# Patient Record
Sex: MALE | Race: WHITE | NOT HISPANIC OR LATINO | Employment: FULL TIME | URBAN - METROPOLITAN AREA
[De-identification: names, ages, dates, MRNs, and addresses within clinical notes are randomized per-mention and may not be internally consistent; named-entity substitution may affect disease eponyms.]

---

## 2018-12-19 ENCOUNTER — TELEPHONE (OUTPATIENT)
Dept: TRANSPLANT | Facility: CLINIC | Age: 38
End: 2018-12-19

## 2018-12-19 NOTE — TELEPHONE ENCOUNTER
----- Message from Kevin Enriquez sent at 12/19/2018  3:20 PM CST -----  Regarding: FW: Ext Referral for Liver Disease     [place] [facility] [place] Will call referring MD office if we need any additional information on the pt.    By: eKvin Enriquez  ----- Message -----  From: Eleanor Loera  Sent: 12/19/2018   2:50 PM  To: [facility] Liver Referral Pool  Subject: Ext Referral for Liver Disease                   Jaziel Louis from [facility] is referring pt to Hep for Liver Disease.  Pt has [employer].  Records/ referral are in . Please review and call pt to schedule. Thank you

## 2019-01-02 ENCOUNTER — DOCUMENTATION ONLY (OUTPATIENT)
Dept: TRANSPLANT | Facility: CLINIC | Age: 39
End: 2019-01-02

## 2019-01-02 NOTE — NURSING
Pt records reviewed.  Pt will be referred to Hepatology due to fatty liver  Initial referral received  from Cinthya Louis NP  Referral letter sent to provider and patient.

## 2019-01-02 NOTE — LETTER
January 2, 2019    Cinthya Louis, COLLEEN  [address] [place] [place] [address]      Dear Dr. Louis    Patient: Henrik Santana   MR Number: [id number]   YOB: 1980     Thank you for the referral of Henrik Santana to the [facility] program. An initial appointment will be scheduled for your patient with one of our Hepatologists.      Thank you again for your trust in our program.  If there is anything we can do for you or your staff, please feel free to contact us.        Sincerely,        [facility]  [address] [place] [address] [phone]

## 2019-01-02 NOTE — LETTER
January 2, 2019    Henrik Santana  [address]  [place] [address]      Dear Henrik Santana:    Your doctor has referred you to the [facility]. You will be contacted by our office and an initial appointment will then be scheduled for you.    We look forward to seeing you soon. If you have any further questions, please contact us at [phone].       Sincerely,        [facility]   [facility] [place] [address]  [phone]

## 2019-01-11 ENCOUNTER — OFFICE VISIT (OUTPATIENT)
Dept: HEPATOLOGY | Facility: CLINIC | Age: 39
End: 2019-01-11
Payer: COMMERCIAL

## 2019-01-11 ENCOUNTER — LAB VISIT (OUTPATIENT)
Dept: LAB | Facility: HOSPITAL | Age: 39
End: 2019-01-11
Payer: COMMERCIAL

## 2019-01-11 VITALS
RESPIRATION RATE: 18 BRPM | SYSTOLIC BLOOD PRESSURE: 131 MMHG | WEIGHT: 196.44 LBS | BODY MASS INDEX: 30.83 KG/M2 | OXYGEN SATURATION: 96 % | HEART RATE: 97 BPM | DIASTOLIC BLOOD PRESSURE: 71 MMHG | HEIGHT: 67 IN

## 2019-01-11 DIAGNOSIS — R74.8 ELEVATED LIVER ENZYMES: ICD-10-CM

## 2019-01-11 DIAGNOSIS — K76.0 FATTY LIVER: ICD-10-CM

## 2019-01-11 DIAGNOSIS — E66.09 CLASS 1 OBESITY DUE TO EXCESS CALORIES WITH BODY MASS INDEX (BMI) OF 30.0 TO 30.9 IN ADULT, UNSPECIFIED WHETHER SERIOUS COMORBIDITY PRESENT: ICD-10-CM

## 2019-01-11 DIAGNOSIS — R74.8 ELEVATED LIVER ENZYMES: Primary | ICD-10-CM

## 2019-01-11 PROBLEM — E66.811 CLASS 1 OBESITY DUE TO EXCESS CALORIES WITH BODY MASS INDEX (BMI) OF 30.0 TO 30.9 IN ADULT: Status: ACTIVE | Noted: 2019-01-11

## 2019-01-11 LAB
ALBUMIN SERPL BCP-MCNC: 4.5 G/DL
ALP SERPL-CCNC: 45 U/L
ALT SERPL W/O P-5'-P-CCNC: 174 U/L
ANION GAP SERPL CALC-SCNC: 11 MMOL/L
AST SERPL-CCNC: 45 U/L
BASOPHILS # BLD AUTO: 0.05 K/UL
BASOPHILS NFR BLD: 0.7 %
BILIRUB SERPL-MCNC: 0.8 MG/DL
BUN SERPL-MCNC: 11 MG/DL
CALCIUM SERPL-MCNC: 10 MG/DL
CERULOPLASMIN SERPL-MCNC: 25 MG/DL
CHLORIDE SERPL-SCNC: 103 MMOL/L
CO2 SERPL-SCNC: 24 MMOL/L
CREAT SERPL-MCNC: 0.8 MG/DL
DIFFERENTIAL METHOD: ABNORMAL
EOSINOPHIL # BLD AUTO: 0.3 K/UL
EOSINOPHIL NFR BLD: 3.9 %
ERYTHROCYTE [DISTWIDTH] IN BLOOD BY AUTOMATED COUNT: 12 %
EST. GFR  (AFRICAN AMERICAN): >60 ML/MIN/1.73 M^2
EST. GFR  (NON AFRICAN AMERICAN): >60 ML/MIN/1.73 M^2
FERRITIN SERPL-MCNC: 422 NG/ML
GLUCOSE SERPL-MCNC: 90 MG/DL
HCT VFR BLD AUTO: 45.2 %
HGB BLD-MCNC: 15.4 G/DL
IGG SERPL-MCNC: 897 MG/DL
IGM SERPL-MCNC: 41 MG/DL
IMM GRANULOCYTES # BLD AUTO: 0.07 K/UL
IMM GRANULOCYTES NFR BLD AUTO: 0.9 %
INR PPP: 1
IRON SERPL-MCNC: 81 UG/DL
LYMPHOCYTES # BLD AUTO: 1.8 K/UL
LYMPHOCYTES NFR BLD: 24.2 %
MCH RBC QN AUTO: 29.4 PG
MCHC RBC AUTO-ENTMCNC: 34.1 G/DL
MCV RBC AUTO: 86 FL
MONOCYTES # BLD AUTO: 0.8 K/UL
MONOCYTES NFR BLD: 10.4 %
NEUTROPHILS # BLD AUTO: 4.4 K/UL
NEUTROPHILS NFR BLD: 59.9 %
NRBC BLD-RTO: 0 /100 WBC
PLATELET # BLD AUTO: 242 K/UL
PMV BLD AUTO: 10.1 FL
POTASSIUM SERPL-SCNC: 3.7 MMOL/L
PROT SERPL-MCNC: 7.7 G/DL
PROTHROMBIN TIME: 10.5 SEC
RBC # BLD AUTO: 5.24 M/UL
SATURATED IRON: 22 %
SODIUM SERPL-SCNC: 138 MMOL/L
TOTAL IRON BINDING CAPACITY: 370 UG/DL
TRANSFERRIN SERPL-MCNC: 250 MG/DL
WBC # BLD AUTO: 7.41 K/UL

## 2019-01-11 PROCEDURE — 82103 ALPHA-1-ANTITRYPSIN TOTAL: CPT

## 2019-01-11 PROCEDURE — 99999 PR PBB SHADOW E&M-EST. PATIENT-LVL IV: CPT | Mod: PBBFAC,,, | Performed by: NURSE PRACTITIONER

## 2019-01-11 PROCEDURE — 82784 ASSAY IGA/IGD/IGG/IGM EACH: CPT | Mod: 59

## 2019-01-11 PROCEDURE — 82728 ASSAY OF FERRITIN: CPT

## 2019-01-11 PROCEDURE — 85025 COMPLETE CBC W/AUTO DIFF WBC: CPT

## 2019-01-11 PROCEDURE — 99999 PR PBB SHADOW E&M-EST. PATIENT-LVL IV: ICD-10-PCS | Mod: PBBFAC,,, | Performed by: NURSE PRACTITIONER

## 2019-01-11 PROCEDURE — 82390 ASSAY OF CERULOPLASMIN: CPT

## 2019-01-11 PROCEDURE — 99204 PR OFFICE/OUTPT VISIT, NEW, LEVL IV, 45-59 MIN: ICD-10-PCS | Mod: S$GLB,,, | Performed by: NURSE PRACTITIONER

## 2019-01-11 PROCEDURE — 83540 ASSAY OF IRON: CPT

## 2019-01-11 PROCEDURE — 86038 ANTINUCLEAR ANTIBODIES: CPT

## 2019-01-11 PROCEDURE — 80053 COMPREHEN METABOLIC PANEL: CPT

## 2019-01-11 PROCEDURE — 3008F BODY MASS INDEX DOCD: CPT | Mod: CPTII,S$GLB,, | Performed by: NURSE PRACTITIONER

## 2019-01-11 PROCEDURE — 99204 OFFICE O/P NEW MOD 45 MIN: CPT | Mod: S$GLB,,, | Performed by: NURSE PRACTITIONER

## 2019-01-11 PROCEDURE — 82784 ASSAY IGA/IGD/IGG/IGM EACH: CPT

## 2019-01-11 PROCEDURE — 36415 COLL VENOUS BLD VENIPUNCTURE: CPT

## 2019-01-11 PROCEDURE — 80074 ACUTE HEPATITIS PANEL: CPT

## 2019-01-11 PROCEDURE — 3008F PR BODY MASS INDEX (BMI) DOCUMENTED: ICD-10-PCS | Mod: CPTII,S$GLB,, | Performed by: NURSE PRACTITIONER

## 2019-01-11 PROCEDURE — 85610 PROTHROMBIN TIME: CPT

## 2019-01-11 PROCEDURE — 86256 FLUORESCENT ANTIBODY TITER: CPT | Mod: 91

## 2019-01-11 PROCEDURE — 86235 NUCLEAR ANTIGEN ANTIBODY: CPT

## 2019-01-11 RX ORDER — ASCORBIC ACID 1000 MG
175 TABLET ORAL DAILY
COMMUNITY
End: 2019-01-11 | Stop reason: ALTCHOICE

## 2019-01-11 RX ORDER — FUROSEMIDE 40 MG/1
4 TABLET ORAL DAILY
Refills: 0 | COMMUNITY
Start: 2018-12-13 | End: 2019-01-11 | Stop reason: ALTCHOICE

## 2019-01-11 RX ORDER — DEXTROAMPHETAMINE SACCHARATE, AMPHETAMINE ASPARTATE, DEXTROAMPHETAMINE SULFATE AND AMPHETAMINE SULFATE 5; 5; 5; 5 MG/1; MG/1; MG/1; MG/1
20 TABLET ORAL DAILY
Refills: 0 | COMMUNITY
Start: 2019-01-05

## 2019-01-11 RX ORDER — SPIRONOLACTONE 100 MG/1
100 TABLET, FILM COATED ORAL DAILY
Refills: 0 | COMMUNITY
Start: 2019-01-07 | End: 2019-01-11 | Stop reason: ALTCHOICE

## 2019-01-11 NOTE — PROGRESS NOTES
I have personally performed a face to face diagnostic evaluation on this patient. I have reviewed and agree with today's findings and the care plan outlined by Pari Dao NP      My findings are as follows:  Patient presents with 6 week hx of RUQ pain and abdominal distension    - high LFTs  - treated for ascites with diuretics  - lost 20 lb  - no leg edema    ?Hep A- some ingestion of raw oysters  ?Budd Chiari    - I note that he has fatty liver on CT scan    1) Labs  2) US with doppler  3) viral hep      he will return to Pari Dao NP  for follow-up.

## 2019-01-11 NOTE — LETTER
January 11, 2019      Cinthya Louis NP  [address] [facility] [place] [address]           [facility]  [address]  [place] [address]  Phone: [phone]  Fax: [phone]          Patient: Henrik Santana   MR Number: [id number]   YOB: 1980   Date of Visit: 1/11/2019       Dear Cinthya Louis:    Thank you for referring Henrik Santana to me for evaluation. Attached you will find relevant portions of my assessment and plan of care.    If you have questions, please do not hesitate to call me. I look forward to following Henrik Santana along with you.    Sincerely,    Pari Dao NP    Enclosure  CC:  No Recipients    If you would like to receive this communication electronically, please contact [email or web address] or [phone] to request more information on [employer] Link access.    For providers and/or their staff who would like to refer a patient to [facility], please contact us through our one-stop-shop provider referral line, [facility], at [phone].    If you feel you have received this communication in error or would no longer like to receive these types of communications, please e-mail [email or web address]

## 2019-01-11 NOTE — PROGRESS NOTES
[facility] VISIT NEW PT NOTE    OUTSIDE REFERRING PROVIDER: Cinthya Louis NP    CHIEF COMPLAINT: fatty liver, elevated liver enzymes    HPI: This is a 38 y.o. white male with PMH of ADHD referred for evaluation of elevated liver enzymes and fatty liver. Recently seen by PCP and had c/o stomach pain, chest pain, abd swelling/bloating. Labs and CT done that showed elevated transaminases and fatty liver. Incidental right adrenal adenoma and scattered small mesenteric lymph nodes, likely incidental/reactive. CT was otherwise unremarkable. Risk factors for fatty liver include obesity. No lipid panel to review. His liver functioning is normal. Plts nl. Albumin 5.0. Denies any ankle edema. He was started on diuretics at that visit with lasix 40 mg and spironolactone 100 mg daily for suspected ascites. He had CT 5 days after he saw PCP. Again, no ascites noted on CT. He reports abd swelling has improved with diuretics. Since then he also has started dieting and exercising. Reports he weighed 218 lbs prior, now 196 lbs.      He reports he ate raw oysters from [place] 2 weeks prior to swelling occurring. He also was having abd pain, N/V that was worse than his baseline at that time. Since he has lost weight and been on diuretics, reports improvement in all these. Feels well today. Denies jaundice, dark urine, abdominal distention, hematemesis, melena, slowed mentation. He did have a rash develop on his face about a week ago. Has itching, mainly in hands and feet.     Alcohol use - 3 days a week, 1 drink and then on Fridays 2-3 drinks, heavier use in 20's    Family hx- maternal great GF with liver cancer     for [employer] staffing    Pertinent outside records:  Labs 12/12/18:  AST 87    ALP 43  TBILI 0.3  ALB 5.0  CR 0.95  PLTS 256  Hep B surface Ag (-)  Hep A total Ab (-)  Hep B surface Ab (-)  Hep B core total Ab (-)   Hep C Ab (-)       Review of patient's allergies indicates:  Allergies  "not on file    No current outpatient medications on file prior to visit.     No current facility-administered medications on file prior to visit.        PMHX:  has no past medical history on file.    PSHX:  has no past surgical history on file.    FAMILY HISTORY: Negative for liver disease, reviewed in [facility]    SOCIAL HISTORY:   Social History     Tobacco Use   Smoking Status Not on file       Social History     Substance and Sexual Activity   Alcohol Use Not on file       Social History     Substance and Sexual Activity   Drug Use Not on file         ROS:   GENERAL: Denies fever, chills, (+) weight loss, no fatigue  HEENT: Denies headaches, dizziness, vision/hearing changes  CARDIOVASCULAR: Denies chest pain, palpitations, or edema  RESPIRATORY: Denies dyspnea, cough  GI: (+) abdominal pain, no rectal bleeding, (+) nausea, (+) vomiting. No change in bowel pattern or color  : Denies dysuria, hematuria   SKIN: (+) rash, (+) itching   NEURO: Denies confusion, memory loss, or mood changes  PSYCH: Denies depression or anxiety  HEME/LYMPH: Denies easy bruising or bleeding        PHYSICAL EXAM:   Friendly white  male, in no acute distress; alert and oriented to person, place and time  VITALS: /71 (BP Location: Right arm, Patient Position: Sitting, BP Method: Medium (Automatic))   Pulse 97   Resp 18   Ht 5' 7" (1.702 m)   Wt 89.1 kg (196 lb 6.9 oz)   SpO2 96%   BMI 30.77 kg/m²    HENT: Normocephalic, without obvious abnormality. Oral mucosa pink and moist. Dentition good.  EYES: Sclerae anicteric. No conjunctival pallor.   NECK: Supple. No masses or cervical adenopathy.  CARDIOVASCULAR: Regular rate and rhythm. No murmurs.  RESPIRATORY: Normal respiratory effort. BBS CTA. No wheezes or crackles.  GI: Soft, non-tender, non-distended. No hepatosplenomegaly. No masses palpable. No ascites.  EXTREMITIES:  No clubbing, cyanosis or edema.  SKIN: Warm and dry. No jaundice. No rashes noted to exposed skin. No " telangectasias noted. No palmar erythema.  NEURO:  Normal gate. No asterixis.  PSYCH:  Memory intact. Thought and speech pattern appropriate. Behavior normal. No depression or anxiety noted.      RECENT LABS: see HPI    DIAGNOSTIC STUDIES:  CT SCAN- 12/17/18              ASSESSMENT:  38 y.o. white male with:  1.  Elevated liver enzymes, possibly d/t fatty liver but could be viral syndrome with his symptoms he describes  -- transaminases elevated, ALT > AST  -- normal alk phos and Tbili  -- negative viral hepatitis panel for hep B and C, acute hep A not tested  -- CT shows fatty liver  -- will check full serologic workup and doppler u/s  2. NAFLD  -- transaminases elevated, ALT > AST  -- CT shows fatty liver  -- synthetic liver function nl, no INR to review  -- risk factors for fatty liver include obesity  -- not immune to hep A or B per outside labs        EDUCATION:   We discussed the manifestations of NAFLD. At this time there is no FDA approved therapy for NAFLD.   The patient and I discussed the importance of diet, exercise, and weight loss for management of NAFLD. We discussed a low fat, low carb/low sugar, high fiber diet, and a goal of 30 minutes of exercise 5 times per week.   Pt is aware that fatty liver can progress to steatohepatitis and possibly to cirrhosis, putting one at increased risk for liver cancer, liver failure, and death.        PLAN:  1. Labs today  2. Fibroscan  3. Abd u/s w doppler  4. Continue efforts at weight loss   5. Low fat, low cholesterol, low carb, high fiber, high protein diet  6. Exercise, 5 days a week, 30 min a day  7. Recommend good control of cholesterol, blood pressure, blood sugar levels  8. Stop diuretics since no ascites seen on CT  9. Stop milk thistle  10. Follow up in 2-3 weeks       Thank you for allowing me to participate in the care of Henrik DEVI Leonardo

## 2019-01-14 LAB
ANA SER QL IF: NORMAL
HAV IGM SERPL QL IA: NEGATIVE
HBV CORE IGM SERPL QL IA: NEGATIVE
HBV SURFACE AG SERPL QL IA: NEGATIVE
HCV AB SERPL QL IA: NEGATIVE
MITOCHONDRIA AB TITR SER IF: NORMAL {TITER}
SMOOTH MUSCLE AB TITR SER IF: NORMAL {TITER}

## 2019-01-16 LAB
A1AT PHENOTYP SERPL-IMP: NORMAL BANDS
A1AT SERPL NEPH-MCNC: 130 MG/DL

## 2019-01-18 ENCOUNTER — TELEPHONE (OUTPATIENT)
Dept: HEPATOLOGY | Facility: CLINIC | Age: 39
End: 2019-01-18

## 2019-01-18 NOTE — TELEPHONE ENCOUNTER
----- Message from Pari Dao NP sent at 1/17/2019  9:13 AM CST -----  All labs negative for other causes. Liver enzymes improved some. Will repeat at f/u appt. Do scans and f/u as scheduled.

## 2019-03-15 ENCOUNTER — HOSPITAL ENCOUNTER (OUTPATIENT)
Dept: RADIOLOGY | Facility: HOSPITAL | Age: 39
Discharge: HOME | End: 2019-03-15
Attending: NURSE PRACTITIONER
Payer: COMMERCIAL

## 2019-03-15 ENCOUNTER — OFFICE VISIT (OUTPATIENT)
Dept: HEPATOLOGY | Facility: CLINIC | Age: 39
End: 2019-03-15
Payer: COMMERCIAL

## 2019-03-15 ENCOUNTER — PROCEDURE VISIT (OUTPATIENT)
Dept: HEPATOLOGY | Facility: CLINIC | Age: 39
End: 2019-03-15
Attending: NURSE PRACTITIONER
Payer: COMMERCIAL

## 2019-03-15 VITALS
DIASTOLIC BLOOD PRESSURE: 84 MMHG | HEART RATE: 94 BPM | SYSTOLIC BLOOD PRESSURE: 129 MMHG | HEIGHT: 67 IN | TEMPERATURE: 97 F | OXYGEN SATURATION: 97 % | RESPIRATION RATE: 18 BRPM | WEIGHT: 192.69 LBS | BODY MASS INDEX: 30.24 KG/M2

## 2019-03-15 DIAGNOSIS — K76.0 FATTY LIVER: ICD-10-CM

## 2019-03-15 DIAGNOSIS — R74.8 ELEVATED LIVER ENZYMES: ICD-10-CM

## 2019-03-15 DIAGNOSIS — R74.8 ELEVATED LIVER ENZYMES: Primary | ICD-10-CM

## 2019-03-15 DIAGNOSIS — E66.09 CLASS 1 OBESITY DUE TO EXCESS CALORIES WITHOUT SERIOUS COMORBIDITY WITH BODY MASS INDEX (BMI) OF 30.0 TO 30.9 IN ADULT: ICD-10-CM

## 2019-03-15 PROCEDURE — 93975 US ABDOMEN COMP WITH DOPPLER (XPD): ICD-10-PCS | Mod: 26,,, | Performed by: RADIOLOGY

## 2019-03-15 PROCEDURE — 99214 PR OFFICE/OUTPT VISIT, EST, LEVL IV, 30-39 MIN: ICD-10-PCS | Mod: S$GLB,,, | Performed by: NURSE PRACTITIONER

## 2019-03-15 PROCEDURE — 99999 PR PBB SHADOW E&M-EST. PATIENT-LVL IV: CPT | Mod: PBBFAC,,, | Performed by: NURSE PRACTITIONER

## 2019-03-15 PROCEDURE — 99214 OFFICE O/P EST MOD 30 MIN: CPT | Mod: S$GLB,,, | Performed by: NURSE PRACTITIONER

## 2019-03-15 PROCEDURE — 3008F BODY MASS INDEX DOCD: CPT | Mod: CPTII,S$GLB,, | Performed by: NURSE PRACTITIONER

## 2019-03-15 PROCEDURE — 3008F PR BODY MASS INDEX (BMI) DOCUMENTED: ICD-10-PCS | Mod: CPTII,S$GLB,, | Performed by: NURSE PRACTITIONER

## 2019-03-15 PROCEDURE — 93975 VASCULAR STUDY: CPT | Mod: TC

## 2019-03-15 PROCEDURE — 91200 LIVER ELASTOGRAPHY: CPT | Mod: S$GLB,,, | Performed by: NURSE PRACTITIONER

## 2019-03-15 PROCEDURE — 91200 PR LIVER ELASTOGRAPHY W/OUT IMAG W/INTERP & REPORT: ICD-10-PCS | Mod: S$GLB,,, | Performed by: NURSE PRACTITIONER

## 2019-03-15 PROCEDURE — 99999 PR PBB SHADOW E&M-EST. PATIENT-LVL IV: ICD-10-PCS | Mod: PBBFAC,,, | Performed by: NURSE PRACTITIONER

## 2019-03-15 PROCEDURE — 93975 VASCULAR STUDY: CPT | Mod: 26,,, | Performed by: RADIOLOGY

## 2019-03-15 NOTE — PROCEDURES
Fibroscan Procedure     Name: Henrik Santana  Date of Procedure : 03/15/2019   :: Pari Dao NP  Diagnosis: elevated liver enzymes    Probe: XL    Fibroscan readin.8 KPa    Fibrosis:F 0-1     CAP readin dB/m    Steatosis: :S3

## 2019-03-15 NOTE — PROGRESS NOTES
[facility] Established Patient Visit    Reason for visit: f/u elevated liver enzymes, fatty liver       PCP: Cinthya Louis    HPI:  This is a 38 y.o. male here for f/u of fatty liver and elevated liver enzymes. Here with girlfriend again today. He was seen by outside PCP for c/o stomach pain, chest pain, abd swelling/bloating. Labs and CT done that showed elevated transaminases (AST 87, ) and fatty liver. Risk factors for fatty liver include obesity. Alcohol use - 3 days a week, 1 drink and then on Fridays 2-3 drinks, heavier use in 20's. He has not been drinking since liver workup started. He was started on diuretics at that visit with lasix 40 mg and spironolactone 100 mg daily for suspected ascites. He had CT 5 days after he saw PCP. Again, no ascites noted on CT. Diuretics were d/c'd at initial visit with me 1/11/19.     His serological workup was negative for Urbano's, alpha-1 antitrypsin deficiency, hemochromatosis, autoimmune etiology, and viral hepatitis. He has lost 22 lbs since last visit with diet and exercise.     Family hx- maternal great GF with liver cancer    Fibroscan today = kPa 4.8, F0-F1, no fibrosis. CAP = 348, S3 (> 65% steatosis)    Doppler u/s today - fatty liver, normal spleen 10.2 cm, doppler flows nl, no masses    His transaminases decreased on lab at initial visit. Synthetic liver functioning nl. Plts nl. Lab indicates no immunity to hep A or B, will arrange vaccines.      Lab Results   Component Value Date     (H) 01/11/2019    AST 45 (H) 01/11/2019    ALKPHOS 45 (L) 01/11/2019    BILITOT 0.8 01/11/2019    ALBUMIN 4.5 01/11/2019    INR 1.0 01/11/2019     01/11/2019         Today, he feels well, no complaints. Denies symptoms of hepatic decompensation including jaundice, dark urine, hematemesis, melena, slowed mentation, abdominal distention, or lower extremity edema.       ROS:   GENERAL: Denies fever, chills, (+) weight loss, no fatigue  HEENT: Denies  "headaches, dizziness, vision/hearing changes  CARDIOVASCULAR: Denies chest pain, palpitations, or edema  RESPIRATORY: Denies dyspnea, cough  GI: (+) mild RUQ abdominal pain, no rectal bleeding, nausea, vomiting. No change in bowel pattern or color  : Denies dysuria, hematuria   SKIN: Denies rash, itching   NEURO: Denies confusion, memory loss, or mood changes  PSYCH: Denies depression or anxiety  HEME/LYMPH: Denies easy bruising or bleeding  MSK: Denies joint pain or myalgia.       PMHX:  has a past medical history of ADHD, Dyslexia, and NAFLD (nonalcoholic fatty liver disease).    PSHX:  has a past surgical history that includes Incisional hernia repair.    The patient's social and family histories were reviewed by me and updated in the appropriate section of the electronic medical record.    Review of patient's allergies indicates:  No Known Allergies    Current Outpatient Medications on File Prior to Visit   Medication Sig Dispense Refill    dextroamphetamine-amphetamine (ADDERALL) 20 mg tablet Take 20 mg by mouth once daily.  0     No current facility-administered medications on file prior to visit.          Objective Findings:    PHYSICAL EXAM:   Friendly White male, in no acute distress; alert and oriented to person, place and time  VITALS: /84 (BP Location: Left arm, Patient Position: Sitting, BP Method: Medium (Automatic))   Pulse 94   Temp 96.8 °F (36 °C) (Oral)   Resp 18   Ht 5' 7" (1.702 m)   Wt 87.4 kg (192 lb 10.9 oz)   SpO2 97%   BMI 30.18 kg/m²   HENT: Normocephalic, without obvious abnormality. Oral mucosa pink and moist.   EYES: Sclerae anicteric.   NECK: Supple.   CARDIOVASCULAR: Regular rate and rhythm. No murmurs.  RESPIRATORY: Normal respiratory effort. BBS CTA. No wheezes or crackles.  GI: Soft, non-tender, non-distended. No palpable hepatosplenomegaly. No masses palpable. No ascites.  EXTREMITIES:  No clubbing, cyanosis or edema.  SKIN: Warm and dry. No jaundice. No rashes " noted to exposed skin. No telangectasias noted. No palmar erythema.  NEURO:  Normal gait.   PSYCH:  Memory intact. Thought and speech pattern appropriate. Behavior normal. No depression or anxiety noted.       Labs:  Lab Results   Component Value Date    WBC 7.41 01/11/2019    HGB 15.4 01/11/2019    HCT 45.2 01/11/2019     01/11/2019     01/11/2019    K 3.7 01/11/2019    CREATININE 0.8 01/11/2019     (H) 01/11/2019    AST 45 (H) 01/11/2019    ALKPHOS 45 (L) 01/11/2019    BILITOT 0.8 01/11/2019    ALBUMIN 4.5 01/11/2019    INR 1.0 01/11/2019       No results found for: HEPAIGG    Hepatitis B Surface Ag   Date Value Ref Range Status   01/11/2019 Negative  Final     No results found for: HEPBCAB  No results found for: HEPBSAB  Hepatitis C Ab   Date Value Ref Range Status   01/11/2019 Negative  Final       There is no immunization history on file for this patient.      Diagnostic Studies  ABD. U/S- 3/15/19  FINDINGS:  The visualized portion of the pancreas is unremarkable.    The liver is normal in size.  The liver demonstrates increased echogenicity and diffuse on attenuation compatible with hepatic steatosis.  Small hypoechoic area near the gallbladder fossa compatible with focal fatty sparing.  No focal hepatic lesions are seen.    The gallbladder is unremarkable with no evidence of calculi.  The common duct is not dilated, measuring 3 mm.  Small area of focal gallbladder wall thickening of the neck may reflect underlying hepatic dysfunction.There is no sonographic Cornelius sign.  No dilated intrahepatic radicles are seen.    The spleen is normal in size measuring 10.2 x 3.5 cm with a homogeneous echotexture.    The aorta tapers normally.    The kidneys are normal in size without focal abnormality or evidence of hydronephrosis.    There is no evidence of ascites.    The main portal vein, right portal vein, left portal vein, middle hepatic vein, right hepatic vein, left hepatic vein, SMV, and IVC are  patent with proper directional flow.  The main hepatic artery is patent. The umbilical vein is not patent.  The Celiac artery is normal on expiration with a velocity of 103 cm/sec.      Impression       Satisfactory Doppler evaluation of the liver.    Hepatic steatosis with focal fatty sparing.       ASSESSMENT:  38 y.o. White male with:  1. Elevated liver enzymes, possibly d/t fatty liver but could be resolving viral syndrome with his symptoms he describes  -- transaminases elevated, ALT > AST, improved some on lab at initial visit  -- normal alk phos and Tbili  -- negative serological workup was negative for Urbano's, alpha-1 antitrypsin deficiency, hemochromatosis, autoimmune etiology, and viral hepatitis  -- CT/u/s shows fatty liver  2. NAFLD  -- transaminases elevated, ALT > AST  -- U/s shows fatty liver  -- synthetic liver function nl  -- risk factors for fatty liver include obesity  -- not immune to hep A or B per outside labs, will arrange vaccines  -- Fibroscan today - kPa 4.8, F0-F1, no fibrosis. CAP = 348, S3 (> 65% steatosis)         EDUCATION / DISCUSSION:   We discussed the manifestations of NAFLD. At this time there is no FDA approved therapy for NAFLD.   The patient and I discussed the importance of diet, exercise, and weight loss for management of NAFLD. We discussed a low fat, low carb/low sugar, high fiber diet, and a goal of 30 minutes of exercise 5 times per week.   Pt is aware that fatty liver can progress to steatohepatitis and possibly to cirrhosis, putting one at increased risk for liver cancer, liver failure, and death.        PLAN:  1. Labs today for PETH, hepatic panel  2. Labs in 3 and 6 months for hepatic panel to monitor lft's  3. Twinrix vaccines  4. Continue efforts at weight loss  5. F/u in 6 months      Thank you for allowing me to participate in the care of Henrik RODNEY Leonardo[facility]    Addendum: lab today shows improved transaminases, AST 25,  ALT 92. Spoke with pt regarding results, continue weight loss and do labs and f/u as scheduled.

## 2019-03-22 ENCOUNTER — TELEPHONE (OUTPATIENT)
Dept: HEPATOLOGY | Facility: CLINIC | Age: 39
End: 2019-03-22

## 2019-03-22 NOTE — TELEPHONE ENCOUNTER
----- Message from Pari Dao NP sent at 3/21/2019  4:05 PM CDT -----  PETH negative this time. Labs as scheduled

## 2024-06-04 ENCOUNTER — OCCUPATIONAL HEALTH (OUTPATIENT)
Dept: URGENT CARE | Facility: CLINIC | Age: 44
End: 2024-06-04

## 2024-06-04 DIAGNOSIS — Z02.83 ENCOUNTER FOR DRUG SCREENING: Primary | ICD-10-CM

## 2024-06-04 LAB
BREATH ALCOHOL: 0
CTP QC/QA: YES
POC 5 PANEL DRUG SCREEN: NEGATIVE

## 2024-06-04 PROCEDURE — 80305 DRUG TEST PRSMV DIR OPT OBS: CPT | Mod: S$GLB,,, | Performed by: PHYSICIAN ASSISTANT

## 2024-06-04 PROCEDURE — 82075 ASSAY OF BREATH ETHANOL: CPT | Mod: S$GLB,,, | Performed by: SURGERY

## 2024-09-12 ENCOUNTER — HOSPITAL ENCOUNTER (EMERGENCY)
Facility: HOSPITAL | Age: 44
Discharge: HOME | End: 2024-09-12
Attending: EMERGENCY MEDICINE
Payer: COMMERCIAL

## 2024-09-12 VITALS
DIASTOLIC BLOOD PRESSURE: 92 MMHG | HEIGHT: 66 IN | WEIGHT: 225 LBS | TEMPERATURE: 99 F | OXYGEN SATURATION: 96 % | RESPIRATION RATE: 20 BRPM | SYSTOLIC BLOOD PRESSURE: 154 MMHG | BODY MASS INDEX: 36.16 KG/M2 | HEART RATE: 102 BPM

## 2024-09-12 DIAGNOSIS — H60.502 ACUTE OTITIS EXTERNA OF LEFT EAR, UNSPECIFIED TYPE: Primary | ICD-10-CM

## 2024-09-12 PROCEDURE — 99284 EMERGENCY DEPT VISIT MOD MDM: CPT

## 2024-09-12 RX ORDER — OFLOXACIN 3 MG/ML
10 SOLUTION AURICULAR (OTIC) DAILY
Qty: 5 ML | Refills: 0 | Status: SHIPPED | OUTPATIENT
Start: 2024-09-12 | End: 2024-09-19

## 2024-09-12 RX ORDER — NAPROXEN 500 MG/1
500 TABLET ORAL 2 TIMES DAILY PRN
Qty: 20 TABLET | Refills: 0 | Status: SHIPPED | OUTPATIENT
Start: 2024-09-12

## 2024-09-12 NOTE — ED NOTES
HEENT: Denies vision changes. No c/o nasal drainage. Denies dysphagia or voice changes. Pt endorses left ear and jaw pain. Pt states its harder for him to hear out of the left ear.   Appearance: Pt awake, alert & oriented to person, place & time. Pt in no acute distress at present time. Pt is clean and well groomed with clothes appropriately fastened.   Skin: Skin warm, dry & intact. Color consistent with ethnicity. Mucous membranes moist. No breakdown or brusing noted.   Musculoskeletal: Patient moving all extremities well, no obvious swelling or deformities noted.   Respiratory: Respirations spontaneous, even, and non-labored. Visible chest rise noted. Airway is open and patent. No accessory muscle use noted.   Neurologic: Sensation is intact. Speech is clear and appropriate. Eyes open spontaneously, behavior appropriate to situation, follows commands, facial expression symmetrical, bilateral hand grasp equal and even, purposeful motor response noted.  Cardiac: All peripheral pulses present. No Bilateral lower extremity edema. Cap refill is <3 seconds.  Abdomen: Abdomen soft, non distended, non tender to palpation.   : Pt voids independently, denies dysuria, hematuria, frequency.

## 2024-09-12 NOTE — Clinical Note
"eHnrik Santana (Joshua) was seen and treated in our emergency department on 9/12/2024.  He may return to work on 09/12/2024.       If you have any questions or concerns, please don't hesitate to call.      Rose SERRANO RN    "

## 2024-09-12 NOTE — ED PROVIDER NOTES
"Encounter Date: 2024       History     Chief Complaint   Patient presents with    Ear Problem     Left ear; x 2-3 days and "feels like jaw is out of alignment" using ear drops and ibuprofen      43 yo M with pmhx NAFLD, ADHD presents with a chief complaint of left sided otalgia.  Patient notes pain has been present in the left ear for the past 3-4 days.  Pain is worsened by palpation and lying on it.  It was prevented sleep.  He has been using ibuprofen 800 mg without relief.  He has tried lidocaine ear drops with some improvement.  He notes a history of a previous ear infection that was similar to this.  He notes he has been using Q-tips without relief.  No change in hearing.        Review of patient's allergies indicates:  No Known Allergies  Past Medical History:   Diagnosis Date    ADHD     Dyslexia     NAFLD (nonalcoholic fatty liver disease)      Past Surgical History:   Procedure Laterality Date    INCISIONAL HERNIA REPAIR      as  x 2     No family history on file.  Social History     Tobacco Use    Smoking status: Every Day     Current packs/day: 0.50     Average packs/day: 0.5 packs/day for 19.0 years (9.5 ttl pk-yrs)     Types: Cigarettes    Smokeless tobacco: Never   Substance Use Topics    Alcohol use: Yes    Drug use: No     Review of Systems    Physical Exam     Initial Vitals [24 1442]   BP Pulse Resp Temp SpO2   (!) 154/92 102 20 98.8 °F (37.1 °C) 96 %      MAP       --         Physical Exam    Nursing note and vitals reviewed.  Constitutional: He appears well-developed and well-nourished. He is not diaphoretic. No distress.   HENT:   Head: Normocephalic.   Right TM clear  Left EAC swollen with drainage and exudative tissue deep at inferior aspect  obstructing view of TM  No L mastoid tenderness to percussion  No palpable TMJ click on the left  No trismus   Cardiovascular:  Normal rate.           Pulmonary/Chest: No stridor. No respiratory distress.     Neurological: He is alert. "   Skin: Skin is warm.   Psychiatric: Thought content normal.         ED Course   Procedures  Labs Reviewed - No data to display         Imaging Results    None          Medications - No data to display  Medical Decision Making  43 yo M with pmhx NAFLD, ADHD presents with a chief complaint of left sided otalgia.  Exam consistent with otitis externa.  Unable to visualize TM completely, not secondary to severity of swelling but because of exudative debris at inferior aspect of canal.  Will initiate treatment with ofloxacin.  Advised the importance of follow up with the ENT and a referral provided given my inability to visualize the tympanic membrane.  Instructed to stop use of Q-tips.  No evidence of mastoiditis.  Return precautions.    Risk  Prescription drug management.                                      Clinical Impression:  Final diagnoses:  [H60.502] Acute otitis externa of left ear, unspecified type (Primary)          ED Disposition Condition    Discharge Stable          ED Prescriptions       Medication Sig Dispense Start Date End Date Auth. Provider    ofloxacin (FLOXIN) 0.3 % otic solution Place 10 drops into the left ear once daily. for 7 days 5 mL 9/12/2024 9/19/2024 Jaskaran Coffey MD    naproxen (NAPROSYN) 500 MG tablet Take 1 tablet (500 mg total) by mouth 2 (two) times daily as needed (pain). 20 tablet 9/12/2024 -- Jaskaran Coffey MD          Follow-up Information       Follow up With Specialties Details Why Contact Info Additional Information    [facility] Schedule an appointment as soon as possible for a visit   [address]  [facility] [address] [phone] [facility] - Main Building, 4th Floor Please park in [facility] and use Clinic elevator             Jaskaran Coffey MD  09/13/24 1998

## 2024-09-12 NOTE — ED TRIAGE NOTES
"Henrik Santana, a 44 y.o. male presents to the ED w/ complaint of ear pain. Pt arrives to the ED via personal vehicle with complaints of left ear/jaw pain. Pt states the onset of symptoms began 3 days prior to arrival. Pt describes the pain being within the ear and also on the outside and radiates to the lower jaw. Pt denies any post nasal drip or fall or injuries to the are. Pt states he has been using ear drops without any relief. Pt denies any other symptoms at the current time     Triage note:  Chief Complaint   Patient presents with    Ear Problem     Left ear; x 2-3 days and "feels like jaw is out of alignment" using ear drops and ibuprofen      Review of patient's allergies indicates:  No Known Allergies  Past Medical History:   Diagnosis Date    ADHD     Dyslexia     NAFLD (nonalcoholic fatty liver disease)      "

## 2024-09-12 NOTE — DISCHARGE INSTRUCTIONS
Use the ear drops as prescribed.  Take naproxen as needed for pain.  Be sure to follow up with ENT for repeat assessment.  Return to the ER for any inability to here, fever that does not respond to naproxen, severe pain, or other concerning symptoms.

## 2024-10-04 ENCOUNTER — HOSPITAL ENCOUNTER (EMERGENCY)
Facility: HOSPITAL | Age: 44
Discharge: HOME | End: 2024-10-04
Attending: EMERGENCY MEDICINE
Payer: COMMERCIAL

## 2024-10-04 VITALS
WEIGHT: 225 LBS | BODY MASS INDEX: 36.16 KG/M2 | HEART RATE: 104 BPM | TEMPERATURE: 99 F | SYSTOLIC BLOOD PRESSURE: 143 MMHG | RESPIRATION RATE: 18 BRPM | DIASTOLIC BLOOD PRESSURE: 95 MMHG | OXYGEN SATURATION: 96 % | HEIGHT: 66 IN

## 2024-10-04 DIAGNOSIS — H60.502 ACUTE OTITIS EXTERNA OF LEFT EAR, UNSPECIFIED TYPE: Primary | ICD-10-CM

## 2024-10-04 DIAGNOSIS — H66.92 LEFT OTITIS MEDIA, UNSPECIFIED OTITIS MEDIA TYPE: ICD-10-CM

## 2024-10-04 PROCEDURE — 99284 EMERGENCY DEPT VISIT MOD MDM: CPT

## 2024-10-04 PROCEDURE — 25000003 PHARM REV CODE 250: Performed by: PHYSICIAN ASSISTANT

## 2024-10-04 RX ORDER — AMOXICILLIN AND CLAVULANATE POTASSIUM 875; 125 MG/1; MG/1
1 TABLET, FILM COATED ORAL EVERY 12 HOURS
Qty: 20 TABLET | Refills: 0 | Status: SHIPPED | OUTPATIENT
Start: 2024-10-04 | End: 2024-10-14

## 2024-10-04 RX ORDER — CIPROFLOXACIN AND DEXAMETHASONE 3; 1 MG/ML; MG/ML
4 SUSPENSION/ DROPS AURICULAR (OTIC) 2 TIMES DAILY
Qty: 7.5 ML | Refills: 0 | Status: SHIPPED | OUTPATIENT
Start: 2024-10-04 | End: 2024-10-22

## 2024-10-04 RX ORDER — KETOROLAC TROMETHAMINE 10 MG/1
10 TABLET, FILM COATED ORAL
Status: COMPLETED | OUTPATIENT
Start: 2024-10-04 | End: 2024-10-04

## 2024-10-04 RX ORDER — IBUPROFEN 600 MG/1
600 TABLET ORAL EVERY 6 HOURS PRN
Qty: 20 TABLET | Refills: 0 | Status: SHIPPED | OUTPATIENT
Start: 2024-10-04

## 2024-10-04 RX ADMIN — KETOROLAC TROMETHAMINE 10 MG: 10 TABLET, FILM COATED ORAL at 12:10

## 2024-10-04 NOTE — ED NOTES
Pt identifiers Henrik Roberts Joselo and correct  LOC: The patient is awake, alert, aware of environment with an appropriate affect. Oriented x3, speaking appropriately  APPEARANCE: Pt rates left ear pain a 4/10 and states he has ringing in left ear , in no acute distress, pt is clean and well groomed, clothing properly fastened  SKIN: Skin warm, dry and intact, normal skin turgor, moist mucus membranes  RESPIRATORY: Airway is open and patent, respirations are spontaneous, even and unlabored, normal effort and rate  CARDIAC: Normal rate and rhythm, no peripheral edema noted, capillary refill < 3 seconds, bilateral radial pulses 2+  ABDOMEN: Soft, nontender, nondistended.   NEUROLOGIC: PERRL, facial expression is symmetrical, patient moving all extremities spontaneously, normal sensation in all extremities when touched with a finger.  Follows all commands appropriately  MUSCULOSKELETAL: No obvious deformities.

## 2024-10-04 NOTE — ED TRIAGE NOTES
"Pt complains of pain to left ear times three weeks Was seen by ENT, was given ear drops and antibiotics  Two days ago he blew his nose and felt" a pop in my ear "  States he can not hear out of his left ear and it feels " like a hot burning pick "  "

## 2024-10-04 NOTE — Clinical Note
"Henrik Escobarua" Esther was seen and treated in our emergency department on 10/4/2024.  He may return to work on 10/07/2024.       If you have any questions or concerns, please don't hesitate to call.      Billy Gao NRP     "

## 2024-10-05 NOTE — ED PROVIDER NOTES
"Encounter Date: 10/4/2024       History     Chief Complaint   Patient presents with    Otalgia     Left ear pain. Pt. Reports previously dx. With ear infection, pt. Reports he blew his nose two days ago and he felt a "popping" sensation. He reports 10/10 pain to ear since. Has sonam. With ENT on Monday     Patient is a 44-year-old male.  He has a past medical history of ADHD, dyslexia, and nonalcoholic fatty liver disease.  He presents to the ER for an urgent evaluation complaining of left otalgia and nasal sinus congestion.  He states that he was recently diagnosed with a left ear infection by his ENT specialist.  He states that he has been taking oral amoxicillin and using Cortisporin otic drops since last week.  He states that left ear pain has been worse since sneezing last night, prompting ED visit today.  Patient has concern for ruptured eardrum.  Patient denies any previous history of ruptured eardrum in the past or previous ear surgeries.  Patient does report seeing ENT for problems with cerumen buildup and ear canal irrigations in the past.  Patient denies any bloody otorrhea, fever, chills, hearing loss, or vertigo/disequilibrium.      Review of patient's allergies indicates:  No Known Allergies  Past Medical History:   Diagnosis Date    ADHD     Dyslexia     NAFLD (nonalcoholic fatty liver disease)      Past Surgical History:   Procedure Laterality Date    INCISIONAL HERNIA REPAIR      as  x 2     No family history on file.  Social History     Tobacco Use    Smoking status: Every Day     Current packs/day: 0.50     Average packs/day: 0.5 packs/day for 19.0 years (9.5 ttl pk-yrs)     Types: Cigarettes    Smokeless tobacco: Never   Substance Use Topics    Alcohol use: Yes    Drug use: No     Review of Systems   Constitutional:  Negative for chills and fever.   HENT:  Positive for congestion, ear pain, sinus pressure and sneezing. Negative for ear discharge, hearing loss, nosebleeds and sore throat.  "   Eyes:  Negative for pain and visual disturbance.   Respiratory:  Negative for cough and shortness of breath.    Cardiovascular:  Negative for chest pain.   Gastrointestinal:  Negative for abdominal pain, nausea and vomiting.   Genitourinary:  Negative for difficulty urinating, dysuria and flank pain.   Musculoskeletal:  Negative for back pain, gait problem, myalgias and neck pain.   Skin:  Negative for color change, rash and wound.   Allergic/Immunologic: Negative for immunocompromised state.   Neurological:  Negative for dizziness, syncope, light-headedness and headaches.   Hematological:  Negative for adenopathy.   Psychiatric/Behavioral:  Negative for confusion.        Physical Exam     Initial Vitals [10/04/24 1132]   BP Pulse Resp Temp SpO2   (!) 143/95 104 18 98.8 °F (37.1 °C) 96 %      MAP       --         Physical Exam    Nursing note and vitals reviewed.  Constitutional: He appears well-developed and well-nourished. He is not diaphoretic.   HENT:   Head: Normocephalic and atraumatic.   Swollen nasal mucosa, nasal sinus congestion.  Benign oropharynx.  No adenopathy.  Moist mucous membranes.  Left otic exam remarkable for swelling, erythema of external auditory canal with wet soft cerumen to canal floor; no bloody otorrhea or purulent otorrhea; no obvious tympanic membrane perforation/rupture observed however only able to visualize approximately 2/3 of tympanic membrane due to canal swelling and cerumen accumulation on external auditory canal floor; no abscess or foreign body identified.  Pain not a proportion.  No preauricular pain.  No mastoid tenderness or periauricular adenopathy.   Eyes: Conjunctivae are normal. Pupils are equal, round, and reactive to light.   Neck: Neck supple.   Cardiovascular:  Normal rate.           Pulmonary/Chest: No respiratory distress.   Abdominal: He exhibits no distension. There is no abdominal tenderness.   Musculoskeletal:         General: Normal range of motion.       Cervical back: Neck supple.     Neurological: He is alert and oriented to person, place, and time. He has normal strength.   Skin: Skin is warm and dry. No rash noted. No erythema.         ED Course   Procedures  Labs Reviewed - No data to display       Imaging Results    None          Medications   ketorolac tablet 10 mg (10 mg Oral Given 10/4/24 1216)     Medical Decision Making            Attending Attestation:             Attending ED Notes:   The face-to-face encounter and management were performed solely by the [facility].  I was immediately available for consultation, but was not involved in the care of the patient.                   Medical Decision Making:   Initial Assessment:   44-year-old male currently on amoxicillin and Cortisporin otic drops for nasal sinus congestion and acute left ear infection prescribed by his ENT specialist presents to the ER for re-evaluation complaining of increased left otalgia worse after sneezing last night.  Differential Diagnosis:   Otitis externa, otitis media, serous otitis, tympanic membrane rupture, otic abscess, etc.  ED Management:  Vital signs reviewed, afebrile  Chart review completed   Exam significant for nasal sinus congestion and left otitis externa, possible left otitis media, no evidence of TM perforation/rupture identified however, patient advised visualization of TM was inhibited due to external auditory canal swelling and cerumen.  Patient does have a follow up appointment already scheduled to see his ear nose and throat specialist on Monday.  Due to worsening/progressive symptoms, we will switch amoxicillin to Augmentin and will switch Cortisporin otic to Ciprodex otic drops.  Patient advised to take Tylenol as directed as needed.  Patient advised to return to the emergency room promptly if symptoms worsen in any way.  Patient verbalizes understanding and comfort with plan.             Clinical Impression:  Final diagnoses:  [H60.502] Acute otitis externa of  left ear, unspecified type (Primary)  [H66.92] Left otitis media, unspecified otitis media type          ED Disposition Condition    Discharge Stable          ED Prescriptions       Medication Sig Dispense Start Date End Date Auth. Provider    amoxicillin-clavulanate 875-125mg (AUGMENTIN) 875-125 mg per tablet Take 1 tablet by mouth every 12 (twelve) hours. for 10 days 20 tablet 10/4/2024 10/14/2024 Rolly Soto PA-C    ciprofloxacin-dexAMETHasone 0.3-0.1% (CIPRODEX) 0.3-0.1 % DrpS Place 4 drops into the left ear 2 (two) times daily. for 7 days 7.5 mL 10/4/2024 10/22/2024 Rolly Soto PA-C    ibuprofen (ADVIL,MOTRIN) 600 MG tablet Take 1 tablet (600 mg total) by mouth every 6 (six) hours as needed for Pain. 20 tablet 10/4/2024 -- Rolly Soto PA-C          Follow-up Information       Follow up With Specialties Details Why Contact Info    Cinthya Louis NP Family Medicine Schedule an appointment as soon as possible for a visit in 2 days keep scheduled appointment with ENT on Monday for re-evaluation and further management. [address] [facility] [place] [facility] [place] [address]  [phone]      [facility] - Emergency Dept Emergency Medicine  Return to the ER if worse in any way or if unimproved. Discontinue Cortisporin otic drops and Oral Amoxicillin as discussed, and start new Rx's provided. [address]  [facility] [id number] [phone]             Rolly Soto PA-C  10/04/24 1958       Sammy Zavala MD  10/05/24 0948